# Patient Record
Sex: FEMALE | Race: WHITE | Employment: FULL TIME | URBAN - METROPOLITAN AREA
[De-identification: names, ages, dates, MRNs, and addresses within clinical notes are randomized per-mention and may not be internally consistent; named-entity substitution may affect disease eponyms.]

---

## 2017-08-10 ENCOUNTER — OFFICE VISIT (OUTPATIENT)
Dept: INTERNAL MEDICINE CLINIC | Age: 62
End: 2017-08-10

## 2017-08-10 VITALS
DIASTOLIC BLOOD PRESSURE: 78 MMHG | TEMPERATURE: 97.5 F | RESPIRATION RATE: 18 BRPM | HEIGHT: 70 IN | OXYGEN SATURATION: 100 % | SYSTOLIC BLOOD PRESSURE: 120 MMHG | HEART RATE: 65 BPM | WEIGHT: 202.4 LBS | BODY MASS INDEX: 28.98 KG/M2

## 2017-08-10 DIAGNOSIS — E78.5 HYPERLIPIDEMIA, UNSPECIFIED HYPERLIPIDEMIA TYPE: ICD-10-CM

## 2017-08-10 DIAGNOSIS — Z12.11 SCREEN FOR COLON CANCER: ICD-10-CM

## 2017-08-10 DIAGNOSIS — Z00.00 ROUTINE GENERAL MEDICAL EXAMINATION AT A HEALTH CARE FACILITY: Primary | ICD-10-CM

## 2017-08-10 DIAGNOSIS — N39.0 URINARY TRACT INFECTION WITHOUT HEMATURIA, SITE UNSPECIFIED: ICD-10-CM

## 2017-08-10 DIAGNOSIS — I10 ESSENTIAL HYPERTENSION: ICD-10-CM

## 2017-08-10 LAB
BILIRUB UR QL STRIP: NEGATIVE
GLUCOSE UR-MCNC: NEGATIVE MG/DL
KETONES P FAST UR STRIP-MCNC: NEGATIVE MG/DL
PH UR STRIP: 6 [PH] (ref 4.6–8)
PROT UR QL STRIP: NEGATIVE MG/DL
SP GR UR STRIP: 1.01 (ref 1–1.03)
UA UROBILINOGEN AMB POC: NORMAL (ref 0.2–1)
URINALYSIS CLARITY POC: CLEAR
URINALYSIS COLOR POC: YELLOW
URINE BLOOD POC: NEGATIVE
URINE LEUKOCYTES POC: NORMAL
URINE NITRITES POC: NEGATIVE

## 2017-08-10 RX ORDER — CIPROFLOXACIN 500 MG/1
500 TABLET ORAL 2 TIMES DAILY
Qty: 6 TAB | Refills: 0 | Status: SHIPPED | OUTPATIENT
Start: 2017-08-10 | End: 2017-08-13

## 2017-08-10 NOTE — PATIENT INSTRUCTIONS
Well Visit, Women 48 to 72: Care Instructions  Your Care Instructions  Physical exams can help you stay healthy. Your doctor has checked your overall health and may have suggested ways to take good care of yourself. He or she also may have recommended tests. At home, you can help prevent illness with healthy eating, regular exercise, and other steps. Follow-up care is a key part of your treatment and safety. Be sure to make and go to all appointments, and call your doctor if you are having problems. It's also a good idea to know your test results and keep a list of the medicines you take. How can you care for yourself at home? · Reach and stay at a healthy weight. This will lower your risk for many problems, such as obesity, diabetes, heart disease, and high blood pressure. · Get at least 30 minutes of exercise on most days of the week. Walking is a good choice. You also may want to do other activities, such as running, swimming, cycling, or playing tennis or team sports. · Do not smoke. Smoking can make health problems worse. If you need help quitting, talk to your doctor about stop-smoking programs and medicines. These can increase your chances of quitting for good. · Protect your skin from too much sun. When you're outdoors from 10 a.m. to 4 p.m., stay in the shade or cover up with clothing and a hat with a wide brim. Wear sunglasses that block UV rays. Even when it's cloudy, put broad-spectrum sunscreen (SPF 30 or higher) on any exposed skin. · See a dentist one or two times a year for checkups and to have your teeth cleaned. · Wear a seat belt in the car. · Limit alcohol to 1 drink a day. Too much alcohol can cause health problems. Follow your doctor's advice about when to have certain tests. These tests can spot problems early. · Cholesterol.  Your doctor will tell you how often to have this done based on your age, family history, or other things that can increase your risk for heart attack and stroke. · Blood pressure. Have your blood pressure checked during a routine doctor visit. Your doctor will tell you how often to check your blood pressure based on your age, your blood pressure results, and other factors. · Mammogram. Ask your doctor how often you should have a mammogram, which is an X-ray of your breasts. A mammogram can spot breast cancer before it can be felt and when it is easiest to treat. · Pap test and pelvic exam. Ask your doctor how often you should have a Pap test. You may not need to have a Pap test as often as you used to. · Vision. Have your eyes checked every year or two or as often as your doctor suggests. Some experts recommend that you have yearly exams for glaucoma and other age-related eye problems starting at age 48. · Hearing. Tell your doctor if you notice any change in your hearing. You can have tests to find out how well you hear. · Diabetes. Ask your doctor whether you should have tests for diabetes. · Colon cancer. You should begin tests for colon cancer at age 48. You may have one of several tests. Your doctor will tell you how often to have tests based on your age and risk. Risks include whether you already had a precancerous polyp removed from your colon or whether your parents, sisters and brothers, or children have had colon cancer. · Thyroid disease. Talk to your doctor about whether to have your thyroid checked as part of a regular physical exam. Women have an increased chance of a thyroid problem. · Osteoporosis. You should begin tests for bone density at age 72. If you are younger than 72, ask your doctor whether you have factors that may increase your risk for this disease. You may want to have this test before age 72. · Heart attack and stroke risk. At least every 4 to 6 years, you should have your risk for heart attack and stroke assessed.  Your doctor uses factors such as your age, blood pressure, cholesterol, and whether you smoke or have diabetes to show what your risk for a heart attack or stroke is over the next 10 years. When should you call for help? Watch closely for changes in your health, and be sure to contact your doctor if you have any problems or symptoms that concern you. Where can you learn more? Go to http://afsaneh-jeana.info/. Enter O352 in the search box to learn more about \"Well Visit, Women 50 to 72: Care Instructions. \"  Current as of: July 19, 2016  Content Version: 11.3  © 3399-8571 [employer] [place]. Care instructions adapted under license by [employer] (which disclaims liability or warranty for this information). If you have questions about a medical condition or this instruction, always ask your healthcare professional. [address] any warranty or liability for your use of this information. Learning About Cutting Calories  How do calories affect your weight? Food gives your body energy. Energy from the food you eat is measured in calories. This energy keeps your heart beating, your brain active, and your muscles working. Your body needs a certain number of calories each day. After your body uses the calories it needs, it stores extra calories as fat. To lose weight safely, you have to eat fewer calories while eating in a healthy way. How many calories do you need each day? The more active you are, the more calories you need. When you are less active, you need fewer calories. How many calories you need each day also depends on several things, including your age and whether you are male or female. Here are some general guidelines for adults:  · Less active women and older adults need 1,600 to 2,000 calories each day. · Active women and less active men need 2,000 to 2,400 calories each day. · Active men need 2,400 to 3,000 calories each day. How can you cut calories and eat healthy meals?   Whole grains, vegetables and fruits, and dried beans are good lower-calorie foods. They give you lots of nutrients and fiber. And they fill you up. Sweets, energy drinks, and soda pop are high in calories. They give you few nutrients and no fiber. Try to limit soda pop, fruit juice, and energy drinks. Drink water instead. Some fats can be part of a healthy diet. But cutting back on fats from highly processed foods like fast foods and many snack foods is a good way to lower the calories in your diet. Also, use smaller amounts of fats like butter, margarine, salad dressing, and mayonnaise. Add fresh garlic, lemon, or herbs to your meals to add flavor without adding fat. Meats and dairy products can be a big source of hidden fats. Try to choose lean or low-fat versions of these products. Fat-free cookies, candies, chips, and frozen treats can still be high in sugar and calories. Some fat-free foods have more calories than regular ones. Eat fat-free treats in moderation, as you would other foods. If your favorite foods are high in fat, salt, sugar, or calories, limit how often you eat them. Eat smaller servings, or look for healthy substitutes. Fill up on fruits, vegetables, and whole grains. Eating at home  · Use meat as a side dish instead of as the main part of your meal.  · Try main dishes that use whole wheat pasta, brown rice, dried beans, or vegetables. · Find ways to cook with little or no fat, such as broiling, steaming, or grilling. · Use cooking spray instead of oil. If you use oil, use a monounsaturated oil, such as canola or olive oil. · Trim fat from meats before you cook them. · Drain off fat after you brown the meat or while you roast it. · Chill soups and stews after you cook them. Then skim the fat off the top after it hardens. Eating out  · Order foods that are broiled or poached rather than fried or breaded. · Cut back on the amount of butter or margarine that you use on bread.   · Order sauces, gravies, and salad dressings on the side, and use only a little. · When you order pasta, choose tomato sauce rather than cream sauce. · Ask for salsa with your baked potato instead of sour cream, butter, cheese, or owusu. · Order meals in a small size instead of upgrading to a large. · Share an entree, or take part of your food home to eat as another meal.  · Share appetizers and desserts. Where can you learn more? Go to http://afsaneh-jeana.info/. Enter 99 [id number] in the search box to learn more about \"Learning About Cutting Calories. \"  Current as of: November 9, 2016  Content Version: 11.3  © 2758-0589 [employer]. Care instructions adapted under license by [employer] (which disclaims liability or warranty for this information). If you have questions about a medical condition or this instruction, always ask your healthcare professional. [place] [address] any warranty or liability for your use of this information.

## 2017-08-10 NOTE — MR AVS SNAPSHOT
Visit Information Date & Time Provider Department Dept. Phone Encounter #  
 8/10/2017  9:15 AM Chad Childress, [address] [place] [address],[address] [phone] [id number] Follow-up Instructions Return in about 1 year (around 8/10/2018) for CPE. Follow-up and Disposition History Upcoming Health Maintenance Date Due Pneumococcal 19-64 Highest Risk (1 of 3 - PCV13) 5/27/1976 DTaP/Tdap/Td series (1 - Tdap) 5/27/1978 ZOSTER VACCINE AGE 60> 3/27/2017 COLONOSCOPY 6/27/2017 INFLUENZA AGE 9 TO ADULT 8/1/2017 BREAST CANCER SCRN MAMMOGRAM 8/30/2018 PAP AKA CERVICAL CYTOLOGY 8/30/2019 Allergies as of 8/10/2017  Review Complete On: 8/10/2017 By: Chad Childress MD  
  
 Severity Noted Reaction Type Reactions Guiafen-pse [Pseudoephedrine-guaifenesin]  01/20/2011   Side Effect Other (comments) Current Immunizations  Reviewed on 11/2/2011 Name Date Influenza Vaccine Split 11/1/2012  5:05 PM, 11/2/2011 Not reviewed this visit You Were Diagnosed With   
  
 Codes Comments Routine general medical examination at a health care facility    -  Primary ICD-10-CM: Z00.00 ICD-9-CM: V70.0 Urinary tract infection without hematuria, site unspecified     ICD-10-CM: N39.0 ICD-9-CM: 599.0 Essential hypertension     ICD-10-CM: I10 
ICD-9-CM: 401.9 Hyperlipidemia, unspecified hyperlipidemia type     ICD-10-CM: E78.5 ICD-9-CM: 272.4 Screen for colon cancer     ICD-10-CM: Z12.11 ICD-9-CM: V76.51 Vitals BP Pulse Temp Resp Height(growth percentile) Weight(growth percentile) 120/78 (BP 1 Location: Left arm, BP Patient Position: Sitting) 65 97.5 °F (36.4 °C) (Oral) 18 5' 10\" (1.778 m) 202 lb 6.4 oz (91.8 kg) SpO2 BMI OB Status Smoking Status 100% 29.04 kg/m2 Hysterectomy Never Smoker Vitals History BMI and BSA Data Body Mass Index Body Surface Area 29.04 kg/m 2 2.13 m 2 Preferred Pharmacy Pharmacy Name Phone Aimee Snow E [facility] [phone] Your Updated Medication List  
  
   
This list is accurate as of: 8/10/17  9:41 AM.  Always use your most recent med list.  
  
  
  
  
 ALPRAZolam 0.25 mg tablet Commonly known as:  Paulinaearlene Learyowsky Take 1 Tab by mouth three (3) times daily as needed for Anxiety. CINNAMON 500 mg Cap Generic drug:  cinnamon bark Take 1,000 mg by mouth daily. ciprofloxacin HCl 500 mg tablet Commonly known as:  CIPRO Take 1 Tab by mouth two (2) times a day for 3 days. clindamycin 1 % topical gel Commonly known as:  CLINDAGEL  
APPLY TO AFFECTED AREA AS NEEDED FISH OIL 1,000 mg Cap Generic drug:  omega-3 fatty acids-vitamin e Take 1 Cap by mouth daily. hydroCHLOROthiazide 25 mg tablet Commonly known as:  HYDRODIURIL  
take 1 tablet by mouth once daily  
  
 losartan 50 mg tablet Commonly known as:  COZAAR Take 1 Tab by mouth daily. MUCINEX 600 mg SR tablet Generic drug:  guaiFENesin SR Take 600 mg by mouth two (2) times a day. MULTI-VITAMIN PO Take  by mouth.  
  
 piroxicam 20 mg capsule Commonly known as:  Plascencia Virgil Take 1 Cap by mouth daily. VITAMIN D3 1,000 unit tablet Generic drug:  cholecalciferol Take 2,000 Units by mouth daily. Prescriptions Sent to Pharmacy Refills  
 ciprofloxacin HCl (CIPRO) 500 mg tablet 0 Sig: Take 1 Tab by mouth two (2) times a day for 3 days. Class: Normal  
 Pharmacy: [address] [facility] Ph #: [phone] Route: Oral  
  
We Performed the Following AMB POC URINALYSIS DIP STICK AUTO W/O MICRO [49020 CPT(R)] CBC WITH AUTOMATED DIFF [07595 CPT(R)] CULTURE, URINE [id number] CPT(R)] LIPID PANEL [41066 CPT(R)] METABOLIC PANEL, COMPREHENSIVE [57962 CPT(R)] REFERRAL TO GASTROENTEROLOGY [AAI96 Custom] URINALYSIS W/ RFLX MICROSCOPIC [54094 CPT(R)] VITAMIN D, 25 HYDROXY [id number] CPT(R)] Follow-up Instructions Return in about 1 year (around 8/10/2018) for CPE. To-Do List   
 08/30/2017 1:30 PM  
  Appointment with [facility] 1 at [address] [place] [address] ([phone]) Shower or bathe using soap and water. Do not use deodorant, powder, perfumes, or lotion the day of your exam.  If your prior mammograms were not performed at [facility] 6 please bring films with you or forward prior images 2 days before your procedure. Check in at registration 15min before your appointment time unless you were instructed to do otherwise. A script is not necessary, but if you have one, please bring it on the day of the mammogram or have it faxed to the department. [facility] [phone] [facility]  [phone] [address]  [phone] [facility] [phone] [facility] [facility] [phone] Referral Information Referral ID Referred By Referred To  
  
 [id number] Dora POLANCO [address], [place]   
   [address] [place] [address] Phone: [phone] Fax: [phone] Visits Status Start Date End Date 1 New Request 8/10/17 8/10/18 If your referral has a status of pending review or denied, additional information will be sent to support the outcome of this decision. Patient Instructions Well Visit, Women 48 to 72: Care Instructions Your Care Instructions Physical exams can help you stay healthy. Your doctor has checked your overall health and may have suggested ways to take good care of yourself. He or she also may have recommended tests. At home, you can help prevent illness with healthy eating, regular exercise, and other steps. Follow-up care is a key part of your treatment and safety.  Be sure to make and go to all appointments, and call your doctor if you are having problems. It's also a good idea to know your test results and keep a list of the medicines you take. How can you care for yourself at home? · Reach and stay at a healthy weight. This will lower your risk for many problems, such as obesity, diabetes, heart disease, and high blood pressure. · Get at least 30 minutes of exercise on most days of the week. Walking is a good choice. You also may want to do other activities, such as running, swimming, cycling, or playing tennis or team sports. · Do not smoke. Smoking can make health problems worse. If you need help quitting, talk to your doctor about stop-smoking programs and medicines. These can increase your chances of quitting for good. · Protect your skin from too much sun. When you're outdoors from 10 a.m. to 4 p.m., stay in the shade or cover up with clothing and a hat with a wide brim. Wear sunglasses that block UV rays. Even when it's cloudy, put broad-spectrum sunscreen (SPF 30 or higher) on any exposed skin. · See a dentist one or two times a year for checkups and to have your teeth cleaned. · Wear a seat belt in the car. · Limit alcohol to 1 drink a day. Too much alcohol can cause health problems. Follow your doctor's advice about when to have certain tests. These tests can spot problems early. · Cholesterol. Your doctor will tell you how often to have this done based on your age, family history, or other things that can increase your risk for heart attack and stroke. · Blood pressure. Have your blood pressure checked during a routine doctor visit. Your doctor will tell you how often to check your blood pressure based on your age, your blood pressure results, and other factors. · Mammogram. Ask your doctor how often you should have a mammogram, which is an X-ray of your breasts. A mammogram can spot breast cancer before it can be felt and when it is easiest to treat.  
· Pap test and pelvic exam. Ask your doctor how often you should have a Pap test. You may not need to have a Pap test as often as you used to. · Vision. Have your eyes checked every year or two or as often as your doctor suggests. Some experts recommend that you have yearly exams for glaucoma and other age-related eye problems starting at age 48. · Hearing. Tell your doctor if you notice any change in your hearing. You can have tests to find out how well you hear. · Diabetes. Ask your doctor whether you should have tests for diabetes. · Colon cancer. You should begin tests for colon cancer at age 48. You may have one of several tests. Your doctor will tell you how often to have tests based on your age and risk. Risks include whether you already had a precancerous polyp removed from your colon or whether your parents, sisters and brothers, or children have had colon cancer. · Thyroid disease. Talk to your doctor about whether to have your thyroid checked as part of a regular physical exam. Women have an increased chance of a thyroid problem. · Osteoporosis. You should begin tests for bone density at age 72. If you are younger than 72, ask your doctor whether you have factors that may increase your risk for this disease. You may want to have this test before age 72. · Heart attack and stroke risk. At least every 4 to 6 years, you should have your risk for heart attack and stroke assessed. Your doctor uses factors such as your age, blood pressure, cholesterol, and whether you smoke or have diabetes to show what your risk for a heart attack or stroke is over the next 10 years. When should you call for help? Watch closely for changes in your health, and be sure to contact your doctor if you have any problems or symptoms that concern you. Where can you learn more? Go to http://afsaneh-jeana.info/. Enter W173 in the search box to learn more about \"Well Visit, Women 50 to 72: Care Instructions. \" Current as of: July 19, 2016 Content Version: 11.3 © 5675-2224 [employer]. Care instructions adapted under license by [employer] (which disclaims liability or warranty for this information). If you have questions about a medical condition or this instruction, always ask your healthcare professional. [address] any warranty or liability for your use of this information. Learning About Cutting Calories How do calories affect your weight? Food gives your body energy. Energy from the food you eat is measured in calories. This energy keeps your heart beating, your brain active, and your muscles working. Your body needs a certain number of calories each day. After your body uses the calories it needs, it stores extra calories as fat. To lose weight safely, you have to eat fewer calories while eating in a healthy way. How many calories do you need each day? The more active you are, the more calories you need. When you are less active, you need fewer calories. How many calories you need each day also depends on several things, including your age and whether you are male or female. Here are some general guidelines for adults: 
· Less active women and older adults need 1,600 to 2,000 calories each day. · Active women and less active men need 2,000 to 2,400 calories each day. · Active men need 2,400 to 3,000 calories each day. How can you cut calories and eat healthy meals? Whole grains, vegetables and fruits, and dried beans are good lower-calorie foods. They give you lots of nutrients and fiber. And they fill you up. Sweets, energy drinks, and soda pop are high in calories. They give you few nutrients and no fiber. Try to limit soda pop, fruit juice, and energy drinks. Drink water instead. Some fats can be part of a healthy diet. But cutting back on fats from highly processed foods like fast foods and many snack foods is a good way to lower the calories in your diet.  Also, use smaller amounts of fats like butter, margarine, salad dressing, and mayonnaise. Add fresh garlic, lemon, or herbs to your meals to add flavor without adding fat. Meats and dairy products can be a big source of hidden fats. Try to choose lean or low-fat versions of these products. Fat-free cookies, candies, chips, and frozen treats can still be high in sugar and calories. Some fat-free foods have more calories than regular ones. Eat fat-free treats in moderation, as you would other foods. If your favorite foods are high in fat, salt, sugar, or calories, limit how often you eat them. Eat smaller servings, or look for healthy substitutes. Fill up on fruits, vegetables, and whole grains. Eating at home · Use meat as a side dish instead of as the main part of your meal. 
· Try main dishes that use whole wheat pasta, brown rice, dried beans, or vegetables. · Find ways to cook with little or no fat, such as broiling, steaming, or grilling. · Use cooking spray instead of oil. If you use oil, use a monounsaturated oil, such as canola or olive oil. · Trim fat from meats before you cook them. · Drain off fat after you brown the meat or while you roast it. · Chill soups and stews after you cook them. Then skim the fat off the top after it hardens. Eating out · Order foods that are broiled or poached rather than fried or breaded. · Cut back on the amount of butter or margarine that you use on bread. · Order sauces, gravies, and salad dressings on the side, and use only a little. · When you order pasta, choose tomato sauce rather than cream sauce. · Ask for salsa with your baked potato instead of sour cream, butter, cheese, or owusu. · Order meals in a small size instead of upgrading to a large. · Share an entree, or take part of your food home to eat as another meal. 
· Share appetizers and desserts. Where can you learn more? Go to http://vernell.info/. Enter 99 220383 in the search box to learn more about \"Learning About Cutting Calories. \" Current as of: November 9, 2016 Content Version: 11.3 © 2005-9240 [employer]. Care instructions adapted under license by [employer] (which disclaims liability or warranty for this information). If you have questions about a medical condition or this instruction, always ask your healthcare professional. [address] any warranty or liability for your use of this information. Please provide this summary of care documentation to your next provider. Your primary care clinician is listed as [place]. If you have any questions after today's visit, please call [phone].

## 2017-08-10 NOTE — PROGRESS NOTES
1. Have you been to the ER, urgent care clinic since your last visit? Hospitalized since your last visit?no    2. Have you seen or consulted any other health care providers outside of the [address] [place] [address] since your last visit? Include any pap smears or colon screening.  no

## 2017-08-10 NOTE — PROGRESS NOTES
SUBJECTIVE:   Ms. Mammie Closs is a 63 y.o. female who is here for follow up of routine medical issues. CPE. Formerly she saw Dr. Savanna Ramachandran. Chief Complaint   Patient presents with    Hypertension     pt here today for routine f.u    Bladder Infection     pt concerned wants to get a urine test today for bladder infection       She was losing weight, then \"my mom got into an accident and I gained back some. \"  Got down to 198. Participating in [place]. Wt Readings from Last 3 Encounters:   08/10/17 202 lb 6.4 oz (91.8 kg)   08/30/16 207 lb 9.6 oz (94.2 kg)   08/15/16 207 lb 6.4 oz (94.1 kg)      Went to Urgent Care with UTI, put on bactrim; a few weeks ago. Still has some pressure after urinating. She has \"chronic urethritis\"--she has seen Dr. Trina Wilson in the past.  Treated for 3-4 months with doxy. \"I still have symptoms, and it affects my bowels and everything. 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 \"  Got a sinus infection, and \"It started up again yesterday and I couldn't talk. \"  Took decongestant and mucinex; \"that seemed to help\". Sees allergist, Dr. Jeramie Gómez. She has had allergy shots in the past.  Feels she has reactions to mold at work. At this time, she is otherwise doing well and has brought no other complaints to my attention today. For a list of the medical issues addressed today, see the assessment and plan below.     PMH:  Dr. Ck Aragon is gynecologist.   Past Medical History:   Diagnosis Date    Broken leg 3/28/15    Cancer [facility])     endometrial cancer    Cystitis, acute 1/20/2011    Cystitis, acute 1/20/2011    Frequent UTI     GERD (gastroesophageal reflux disease)     Hypertension     Screening mammogram 04/2010    Vitamin D insufficiency 7/30/2014       Past Surgical History:   Procedure Laterality Date    HX COLONOSCOPY      HX GYN      cervical bx    HX ORTHOPAEDIC  7/3/11    fracture knee    HX ORTHOPAEDIC  2015    BROKEN LEFT LEG    HX MARIZOL AND BSO      Dr. Nilson Ferris; endometrial cancer       All: She is allergic to guiafen-pse [pseudoephedrine-guaifenesin]. Current Outpatient Prescriptions   Medication Sig    ALPRAZolam (XANAX) 0.25 mg tablet Take 1 Tab by mouth three (3) times daily as needed for Anxiety.  losartan (COZAAR) 50 mg tablet Take 1 Tab by mouth daily.  hydrochlorothiazide (HYDRODIURIL) 25 mg tablet take 1 tablet by mouth once daily    clindamycin (CLINDAGEL) 1 % topical gel APPLY TO AFFECTED AREA AS NEEDED    omega-3 fatty acids-vitamin e (FISH OIL) 1,000 mg Cap Take 1 Cap by mouth daily.  cinnamon bark (CINNAMON) 500 mg Cap Take 1,000 mg by mouth daily.  cholecalciferol, vitamin d3, (VITAMIN D) 1,000 unit tablet Take 2,000 Units by mouth daily.  guaifenesin SR (MUCINEX) 600 mg SR tablet Take 600 mg by mouth two (2) times a day.  MULTI-VITAMIN PO Take  by mouth.  azithromycin (ZITHROMAX) 250 mg tablet Take two tablets today then one tablet daily    fluconazole (DIFLUCAN) 200 mg tablet Take 1 Tab by mouth daily.  piroxicam (FELDENE) 20 mg capsule Take 1 Cap by mouth daily. No current facility-administered medications for this visit. FH: Her family history includes Arthritis-rheumatoid in her father; Heart Disease in her father; Hypertension in her father, mother, and sister. F  82 \"heart valve problem. 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 She reports that she has never smoked. She has never used smokeless tobacco. She reports that she does not drink alcohol or use illicit drugs. ROS: See above; Complete ROS otherwise negative. OBJECTIVE:   Vitals:   Visit Vitals    /78 (BP 1 Location: Left arm, BP Patient Position: Sitting)    Pulse 65    Temp 97.5 °F (36.4 °C) (Oral)    Resp 18    Ht 5' 10\" (1.778 m)    Wt 202 lb 6.4 oz (91.8 kg)    SpO2 100%    BMI 29.04 kg/m2      Gen: Pleasant 63 y.o.  female in NAD. HEENT: PERRLA. EOMI. OP moist and pink. Neck: Supple. No LAD.  HEART: RRR, No M/G/R.    LUNGS: CTAB No W/R. ABDOMEN: S, NT, ND, BS+. EXTREMITIES: Warm. No C/C/E.  MUSCULOSKELETAL: Normal ROM, muscle strength 5/5 all groups. NEURO: Alert and oriented x 3. Cranial nerves grossly intact. No focal sensory or motor deficits noted. SKIN: Warm. Dry. No rashes or other lesions noted. Lab Results   Component Value Date/Time    Sodium 139 08/15/2016 11:08 AM    Potassium 4.5 08/15/2016 11:08 AM    Chloride 98 08/15/2016 11:08 AM    CO2 25 08/15/2016 11:08 AM    Anion gap 10 08/25/2011 06:05 AM    Glucose 96 08/15/2016 11:08 AM    BUN 15 08/15/2016 11:08 AM    Creatinine 0.75 08/15/2016 11:08 AM    BUN/Creatinine ratio 20 08/15/2016 11:08 AM    GFR est  08/15/2016 11:08 AM    GFR est non-AA 88 08/15/2016 11:08 AM    Calcium 9.7 08/15/2016 11:08 AM    Bilirubin, total 0.5 08/15/2016 11:08 AM    ALT (SGPT) 18 08/15/2016 11:08 AM    AST (SGOT) 22 08/15/2016 11:08 AM    Alk. phosphatase 86 08/15/2016 11:08 AM    Protein, total 7.3 08/15/2016 11:08 AM    Albumin 4.7 08/15/2016 11:08 AM    Globulin 3.2 08/19/2011 02:30 PM    A-G Ratio 1.8 08/15/2016 11:08 AM       Lab Results   Component Value Date/Time    Cholesterol, total 237 08/15/2016 11:08 AM    HDL Cholesterol 38 08/15/2016 11:08 AM    LDL, calculated 151 08/15/2016 11:08 AM    Triglyceride 240 08/15/2016 11:08 AM       Lab Results   Component Value Date/Time    WBC 8.2 08/15/2016 11:08 AM    HGB 13.5 08/15/2016 11:08 AM    HCT 39.0 08/15/2016 11:08 AM    PLATELET 988 18/87/2074 11:08 AM    MCV 91 08/15/2016 11:08 AM           ASSESSMENT/ PLAN: Jaimie Lang was seen today for follow-up. 1. CPE: Normal exam.   2. UTI: On recheck, trace kathy. We'll get micro and culture. 3. HTN (hypertension): BP fine today. Continue current medications.   - METABOLIC PANEL, COMPREHENSIVE  - CBC WITH AUTOMATED DIFF  4. Hyperlipidemia: Improved since last time. No changes.   - METABOLIC PANEL, COMPREHENSIVE  - LIPID PANEL  5.  Urethritis, nonspecific: Continue follow up with Dr. Nahed Catalan.   - Tima Mahoney, COMPREHENSIVE  6. Back pain: UA ordered and shows result above--could be originating from bladder. Could be radicular pain as she has a history of sciatica. 7. Vitamin D insufficiency:   - VITAMIN D, 25 HYDROXY  8. Hx of colonic polyp  9. Chronic rhinitis:    10. Reactive airways: Sees allergist, Dr. Carmelo Rosenberg. 11. History of endometrial cancer: s/p hysterctomy. Follows with gynecology, Dr. Jonas Morel. 12. Acne: On cleocin cream.     Follow-up Disposition:  Return in about 1 year (around 8/10/2018) for CPE. I have reviewed the patient's medications and risks/side effects/benefits were discussed. Diagnosis(-es) explained to patient and questions answered. Literature provided where appropriate.

## 2017-08-11 LAB
25(OH)D3+25(OH)D2 SERPL-MCNC: 41.6 NG/ML (ref 30–100)
ALBUMIN SERPL-MCNC: 4.5 G/DL (ref 3.6–4.8)
ALBUMIN/GLOB SERPL: 1.8 {RATIO} (ref 1.2–2.2)
ALP SERPL-CCNC: 83 IU/L (ref 39–117)
ALT SERPL-CCNC: 19 IU/L (ref 0–32)
APPEARANCE UR: CLEAR
AST SERPL-CCNC: 22 IU/L (ref 0–40)
BACTERIA #/AREA URNS HPF: NORMAL /[HPF]
BACTERIA UR CULT: NO GROWTH
BASOPHILS # BLD AUTO: 0 X10E3/UL (ref 0–0.2)
BASOPHILS NFR BLD AUTO: 1 %
BILIRUB SERPL-MCNC: 0.4 MG/DL (ref 0–1.2)
BILIRUB UR QL STRIP: NEGATIVE
BUN SERPL-MCNC: 19 MG/DL (ref 8–27)
BUN/CREAT SERPL: 22 (ref 12–28)
CALCIUM SERPL-MCNC: 9.5 MG/DL (ref 8.7–10.3)
CASTS URNS QL MICRO: NORMAL /LPF
CHLORIDE SERPL-SCNC: 99 MMOL/L (ref 96–106)
CHOLEST SERPL-MCNC: 214 MG/DL (ref 100–199)
CO2 SERPL-SCNC: 23 MMOL/L (ref 18–29)
COLOR UR: YELLOW
CREAT SERPL-MCNC: 0.85 MG/DL (ref 0.57–1)
EOSINOPHIL # BLD AUTO: 0.4 X10E3/UL (ref 0–0.4)
EOSINOPHIL NFR BLD AUTO: 6 %
EPI CELLS #/AREA URNS HPF: NORMAL /HPF
ERYTHROCYTE [DISTWIDTH] IN BLOOD BY AUTOMATED COUNT: 14.2 % (ref 12.3–15.4)
GLOBULIN SER CALC-MCNC: 2.5 G/DL (ref 1.5–4.5)
GLUCOSE SERPL-MCNC: 92 MG/DL (ref 65–99)
GLUCOSE UR QL: NEGATIVE
HCT VFR BLD AUTO: 38.6 % (ref 34–46.6)
HDLC SERPL-MCNC: 39 MG/DL
HGB BLD-MCNC: 13.1 G/DL (ref 11.1–15.9)
HGB UR QL STRIP: NEGATIVE
IMM GRANULOCYTES # BLD: 0 X10E3/UL (ref 0–0.1)
IMM GRANULOCYTES NFR BLD: 0 %
KETONES UR QL STRIP: NEGATIVE
LDLC SERPL CALC-MCNC: 135 MG/DL (ref 0–99)
LEUKOCYTE ESTERASE UR QL STRIP: ABNORMAL
LYMPHOCYTES # BLD AUTO: 1.9 X10E3/UL (ref 0.7–3.1)
LYMPHOCYTES NFR BLD AUTO: 28 %
MCH RBC QN AUTO: 31.6 PG (ref 26.6–33)
MCHC RBC AUTO-ENTMCNC: 33.9 G/DL (ref 31.5–35.7)
MCV RBC AUTO: 93 FL (ref 79–97)
MICRO URNS: ABNORMAL
MONOCYTES # BLD AUTO: 0.4 X10E3/UL (ref 0.1–0.9)
MONOCYTES NFR BLD AUTO: 6 %
MUCOUS THREADS URNS QL MICRO: PRESENT
NEUTROPHILS # BLD AUTO: 4 X10E3/UL (ref 1.4–7)
NEUTROPHILS NFR BLD AUTO: 59 %
NITRITE UR QL STRIP: NEGATIVE
PH UR STRIP: 6 [PH] (ref 5–7.5)
PLATELET # BLD AUTO: 320 X10E3/UL (ref 150–379)
POTASSIUM SERPL-SCNC: 4.3 MMOL/L (ref 3.5–5.2)
PROT SERPL-MCNC: 7 G/DL (ref 6–8.5)
PROT UR QL STRIP: NEGATIVE
RBC # BLD AUTO: 4.14 X10E6/UL (ref 3.77–5.28)
RBC #/AREA URNS HPF: NORMAL /HPF
SODIUM SERPL-SCNC: 141 MMOL/L (ref 134–144)
SP GR UR: 1.01 (ref 1–1.03)
TRIGL SERPL-MCNC: 199 MG/DL (ref 0–149)
UROBILINOGEN UR STRIP-MCNC: 0.2 MG/DL (ref 0.2–1)
VLDLC SERPL CALC-MCNC: 40 MG/DL (ref 5–40)
WBC # BLD AUTO: 6.7 X10E3/UL (ref 3.4–10.8)
WBC #/AREA URNS HPF: NORMAL /HPF

## 2017-08-16 ENCOUNTER — TELEPHONE (OUTPATIENT)
Dept: INTERNAL MEDICINE CLINIC | Age: 62
End: 2017-08-16

## 2017-08-21 ENCOUNTER — TELEPHONE (OUTPATIENT)
Dept: INTERNAL MEDICINE CLINIC | Age: 62
End: 2017-08-21

## 2017-08-21 NOTE — TELEPHONE ENCOUNTER
#[phone] please call pt with lab and urine culture results from 8-10-17     Also will you please mail lab results to pt

## 2017-08-21 NOTE — TELEPHONE ENCOUNTER
Message was left for  Patient to contact office about labs and that a copy will be mailed to her. Patient returned call Identified patient 2 identifiers verified. And was informed if lab result.

## 2017-08-22 RX ORDER — LOSARTAN POTASSIUM 50 MG/1
TABLET ORAL
Qty: 90 TAB | Refills: 3 | Status: SHIPPED | OUTPATIENT
Start: 2017-08-22 | End: 2017-11-10 | Stop reason: SDUPTHER

## 2017-08-30 ENCOUNTER — HOSPITAL ENCOUNTER (OUTPATIENT)
Dept: MAMMOGRAPHY | Age: 62
Discharge: HOME | End: 2017-08-30
Attending: OBSTETRICS & GYNECOLOGY
Payer: COMMERCIAL

## 2017-08-30 DIAGNOSIS — Z12.31 VISIT FOR SCREENING MAMMOGRAM: ICD-10-CM

## 2017-08-30 PROCEDURE — 77067 SCR MAMMO BI INCL CAD: CPT

## 2017-09-06 ENCOUNTER — TELEPHONE (OUTPATIENT)
Dept: INTERNAL MEDICINE CLINIC | Age: 62
End: 2017-09-06

## 2017-09-06 NOTE — TELEPHONE ENCOUNTER
Having bladder issues and started on Cipro last night and wants to talk to nurse about pain and discomfort.  Please call [phone]

## 2017-09-06 NOTE — TELEPHONE ENCOUNTER
Identified patient 2 identifiers verified. C/o pain and cramp on left side earlier has stopped some at this time. Taking Azo OTC . Wants to know is there anything else that can be ordered?

## 2017-09-07 ENCOUNTER — TELEPHONE (OUTPATIENT)
Dept: INTERNAL MEDICINE CLINIC | Age: 62
End: 2017-09-07

## 2017-09-07 NOTE — TELEPHONE ENCOUNTER
We can call in some tramadol: 50 mg po q6 hr prn pain (15, 1 refill). She should try to see Urology soon.    BJF

## 2017-09-07 NOTE — TELEPHONE ENCOUNTER
#[phone] pt states she is still having symptoms of the UTI and is requesting to get another script of the Cipro 500 mg to [facility] on file. Pt took appt for tomorrow at 4 pm and doesn't know if she will feel well enough to drive this far. She is asking for a call back as to what Dr. Elicia Vora wants to do.

## 2017-09-07 NOTE — TELEPHONE ENCOUNTER
Tramadol was called in to [facility] per patient request. Message was left for patient about new script and to follow up with Urology.

## 2017-09-08 ENCOUNTER — OFFICE VISIT (OUTPATIENT)
Dept: INTERNAL MEDICINE CLINIC | Age: 62
End: 2017-09-08

## 2017-09-08 VITALS
RESPIRATION RATE: 14 BRPM | WEIGHT: 199.6 LBS | TEMPERATURE: 97.9 F | HEIGHT: 70 IN | DIASTOLIC BLOOD PRESSURE: 73 MMHG | OXYGEN SATURATION: 98 % | SYSTOLIC BLOOD PRESSURE: 107 MMHG | HEART RATE: 82 BPM | BODY MASS INDEX: 28.58 KG/M2

## 2017-09-08 DIAGNOSIS — R10.32 LLQ ABDOMINAL PAIN: Primary | ICD-10-CM

## 2017-09-08 RX ORDER — METRONIDAZOLE 500 MG/1
500 TABLET ORAL 3 TIMES DAILY
Qty: 21 TAB | Refills: 0 | Status: SHIPPED | OUTPATIENT
Start: 2017-09-08 | End: 2017-09-15

## 2017-09-08 RX ORDER — CIPROFLOXACIN 250 MG/1
250 TABLET, FILM COATED ORAL 2 TIMES DAILY
Qty: 14 TAB | Refills: 0 | Status: SHIPPED | OUTPATIENT
Start: 2017-09-08 | End: 2017-09-15

## 2017-09-08 NOTE — PROGRESS NOTES
1. Have you been to the ER, urgent care clinic since your last visit? Hospitalized since your last visit? NO    2. Have you seen or consulted any other health care providers outside of the [address] [place] [address] since your last visit? Include any pap smears or colon screening.  NO

## 2017-09-08 NOTE — TELEPHONE ENCOUNTER
Identified patient 2 identifiers verified. Patient has an appointment with Dr. Beny De today about abdominal issues.

## 2017-09-08 NOTE — PROGRESS NOTES
SUBJECTIVE  Ms. Elsa Griffith presents today acutely for     Chief Complaint   Patient presents with    Abdominal Pain     per today concerned of lower abdominal pain and cramping; pt goes back and forth with cramping and loose bowels        She has had a bit of constipated. For the past couple of weeks, some loose stools and cramping pain with BM. She has had a colonoscopy in the past showing some diverticulosis. OBJECTIVE  Visit Vitals    /73 (BP 1 Location: Left arm, BP Patient Position: Sitting)    Pulse 82    Temp 97.9 °F (36.6 °C) (Oral)    Resp 14    Ht 5' 10\" (1.778 m)    Wt 199 lb 9.6 oz (90.5 kg)    SpO2 98%    BMI 28.64 kg/m2     Gen: Pleasant 63 y.o.  female in NAD.   HEENT: PERRLA. EOMI. OP moist and pink.  Neck: Supple.  No LAD.  HEART: RRR, No M/G/R.   LUNGS: CTAB No W/R.   ABDOMEN: S, +TTP LLQ, ND, BS+.   EXTREMITIES: Warm. No C/C/E. SKIN: Warm. Dry. No rashes or other lesions noted. ASSESSMENT / PLAN    ICD-10-CM ICD-9-CM    1. LLQ abdominal pain--possibly diverticulits R10.32 789.04 CT ABD PELV W WO CONT      CBC WITH AUTOMATED DIFF      METABOLIC PANEL, COMPREHENSIVE      ciprofloxacin HCl (CIPRO) 250 mg tablet      metroNIDAZOLE (FLAGYL) 500 mg tablet       I have reviewed with the patient details of the assessment and plan and all questions were answered. Relevant patient education was performed. Follow-up Disposition:  Return if symptoms worsen or fail to improve.

## 2017-09-08 NOTE — MR AVS SNAPSHOT
Visit Information Date & Time Provider Department Dept. Phone Encounter #  
 9/8/2017  4:00 PM Jori Mcgarry, [address] [place] [address],[address] [phone] [id number] Follow-up Instructions Return if symptoms worsen or fail to improve. Upcoming Health Maintenance Date Due Pneumococcal 19-64 Highest Risk (1 of 3 - PCV13) 5/27/1976 DTaP/Tdap/Td series (1 - Tdap) 5/27/1978 ZOSTER VACCINE AGE 60> 3/27/2017 COLONOSCOPY 6/27/2017 INFLUENZA AGE 9 TO ADULT 8/1/2017 BREAST CANCER SCRN MAMMOGRAM 8/30/2019 PAP AKA CERVICAL CYTOLOGY 8/30/2019 Allergies as of 9/8/2017  Review Complete On: 9/8/2017 By: Kelly Dueñas Severity Noted Reaction Type Reactions Guiafen-pse [Pseudoephedrine-guaifenesin]  01/20/2011   Side Effect Other (comments) Current Immunizations  Reviewed on 11/2/2011 Name Date Influenza Vaccine Split 11/1/2012  5:05 PM, 11/2/2011 Not reviewed this visit You Were Diagnosed With   
  
 Codes Comments LLQ abdominal pain    -  Primary ICD-10-CM: R10.32 
ICD-9-CM: 789.04 Vitals BP Pulse Temp Resp Height(growth percentile) Weight(growth percentile) 107/73 (BP 1 Location: Left arm, BP Patient Position: Sitting) 82 97.9 °F (36.6 °C) (Oral) 14 5' 10\" (1.778 m) 199 lb 9.6 oz (90.5 kg) SpO2 BMI OB Status Smoking Status 98% 28.64 kg/m2 Hysterectomy Never Smoker Vitals History BMI and BSA Data Body Mass Index Body Surface Area  
 28.64 kg/m 2 2.11 m 2 Preferred Pharmacy Pharmacy Name Phone Micky Gallagher, [facility] [phone] Your Updated Medication List  
  
   
This list is accurate as of: 9/8/17  4:46 PM.  Always use your most recent med list.  
  
  
  
  
 ALPRAZolam 0.25 mg tablet Commonly known as:  Roverto Flack Take 1 Tab by mouth three (3) times daily as needed for Anxiety. CINNAMON 500 mg Cap Generic drug:  cinnamon bark Take 1,000 mg by mouth daily. ciprofloxacin HCl 250 mg tablet Commonly known as:  CIPRO Take 1 Tab by mouth two (2) times a day for 7 days. clindamycin 1 % topical gel Commonly known as:  CLINDAGEL  
APPLY TO AFFECTED AREA AS NEEDED FISH OIL 1,000 mg Cap Generic drug:  omega-3 fatty acids-vitamin e Take 1 Cap by mouth daily. hydroCHLOROthiazide 25 mg tablet Commonly known as:  HYDRODIURIL  
take 1 tablet by mouth once daily  
  
 losartan 50 mg tablet Commonly known as:  COZAAR  
take 1 tablet by mouth once daily  
  
 metroNIDAZOLE 500 mg tablet Commonly known as:  FLAGYL Take 1 Tab by mouth three (3) times daily for 7 days. MUCINEX 600 mg SR tablet Generic drug:  guaiFENesin SR Take 600 mg by mouth two (2) times a day. MULTI-VITAMIN PO Take  by mouth.  
  
 piroxicam 20 mg capsule Commonly known as:  Laurena Alejandra Take 1 Cap by mouth daily. VITAMIN D3 1,000 unit tablet Generic drug:  cholecalciferol Take 2,000 Units by mouth daily. Prescriptions Sent to Pharmacy Refills  
 ciprofloxacin HCl (CIPRO) 250 mg tablet 0 Sig: Take 1 Tab by mouth two (2) times a day for 7 days. Class: Normal  
 Pharmacy: [place] [address], [facility] Ph #: [phone] Route: Oral  
 metroNIDAZOLE (FLAGYL) 500 mg tablet 0 Sig: Take 1 Tab by mouth three (3) times daily for 7 days. Class: Normal  
 Pharmacy: [place] [address], [facility] Ph #: [phone] Route: Oral  
  
We Performed the Following CBC WITH AUTOMATED DIFF [94069 CPT(R)] METABOLIC PANEL, COMPREHENSIVE [12335 CPT(R)] Follow-up Instructions Return if symptoms worsen or fail to improve. To-Do List   
 09/08/2017 Imaging:  CT ABD PELV W WO CONT Patient Instructions Diverticulitis: Care Instructions Your Care Instructions Diverticulitis occurs when pouches form in the wall of the colon and become inflamed or infected. It can be very painful. Doctors aren't sure what causes diverticulitis. There is no proof that foods such as nuts, seeds, or berries cause it or make it worse. A low-fiber diet may cause the colon to work harder to push stool forward. Pouches may form because of this extra work. It may be hard to think about healthy eating while you're in pain. But as you recover, you might think about how you can use healthy eating for overall better health. Healthy eating may help you avoid future attacks. Follow-up care is a key part of your treatment and safety. Be sure to make and go to all appointments, and call your doctor if you are having problems. It's also a good idea to know your test results and keep a list of the medicines you take. How can you care for yourself at home? · Drink plenty of fluids, enough so that your urine is light yellow or clear like water. If you have kidney, heart, or liver disease and have to limit fluids, talk with your doctor before you increase the amount of fluids you drink. · Stick to liquids or a bland diet (plain rice, bananas, dry toast or crackers, applesauce) until you are feeling better. Then you can return to regular foods and gradually increase the amount of fiber in your diet. · Use a heating pad set on low on your belly to relieve mild cramps and pain. · Get extra rest until you are feeling better. · Be safe with medicines. Read and follow all instructions on the label. ¨ If the doctor gave you a prescription medicine for pain, take it as prescribed. ¨ If you are not taking a prescription pain medicine, ask your doctor if you can take an over-the-counter medicine. · If your doctor prescribed antibiotics, take them as directed. Do not stop taking them just because you feel better. You need to take the full course of antibiotics. To prevent future attacks of diverticulitis · Avoid constipation: 
¨ Include fruits, vegetables, beans, and whole grains in your diet each day. These foods are high in fiber. ¨ Drink plenty of fluids, enough so that your urine is light yellow or clear like water. If you have kidney, heart, or liver disease and have to limit fluids, talk with your doctor before you increase the amount of fluids you drink. ¨ Get some exercise every day. Build up slowly to 30 to 60 minutes a day on 5 or more days of the week. ¨ Take a fiber supplement, such as Citrucel or Metamucil, every day if needed. Read and follow all instructions on the label. ¨ Schedule time each day for a bowel movement. Having a daily routine may help. Take your time and do not strain when having a bowel movement. When should you call for help? Call 911 anytime you think you may need emergency care. For example, call if: 
· You passed out (lost consciousness). · You vomit blood or what looks like coffee grounds. · You pass maroon or very bloody stools. Call your doctor now or seek immediate medical care if: 
· You have severe pain or swelling in your belly. · You have a new or higher fever. · You cannot keep down fluids or medicines. · You have new pain that gets worse when you move or cough. Watch closely for changes in your health, and be sure to contact your doctor if: · The symptoms you had when you first started feeling sick come back. · You do not get better as expected. Where can you learn more? Go to http://afsaneh-jeana.info/. Enter H901 in the search box to learn more about \"Diverticulitis: Care Instructions. \" Current as of: August 9, 2016 Content Version: 11.3 © 3812-9027 [employer]. Care instructions adapted under license by [employer] (which disclaims liability or warranty for this information).  If you have questions about a medical condition or this instruction, always ask your healthcare professional. [address] any warranty or liability for your use of this information. Introducing [facility] & HEALTH SERVICES! Dear Guillermina Avitia: Thank you for requesting a [employer] account. Our records indicate that you have previously registered for a [employer] account but its currently inactive. Please call our [employer] support line at [phone]. Additional Information If you have questions, please visit the Frequently Asked Questions section of the [employer] website at [email or web address] Remember, [employer] is NOT to be used for urgent needs. For medical emergencies, dial 911. Now available from your iPhone and Android! Please provide this summary of care documentation to your next provider. Your primary care clinician is listed as [place]. If you have any questions after today's visit, please call [phone].

## 2017-09-08 NOTE — PATIENT INSTRUCTIONS
Diverticulitis: Care Instructions  Your Care Instructions    Diverticulitis occurs when pouches form in the wall of the colon and become inflamed or infected. It can be very painful. Doctors aren't sure what causes diverticulitis. There is no proof that foods such as nuts, seeds, or berries cause it or make it worse. A low-fiber diet may cause the colon to work harder to push stool forward. Pouches may form because of this extra work. It may be hard to think about healthy eating while you're in pain. But as you recover, you might think about how you can use healthy eating for overall better health. Healthy eating may help you avoid future attacks. Follow-up care is a key part of your treatment and safety. Be sure to make and go to all appointments, and call your doctor if you are having problems. It's also a good idea to know your test results and keep a list of the medicines you take. How can you care for yourself at home? · Drink plenty of fluids, enough so that your urine is light yellow or clear like water. If you have kidney, heart, or liver disease and have to limit fluids, talk with your doctor before you increase the amount of fluids you drink. · Stick to liquids or a bland diet (plain rice, bananas, dry toast or crackers, applesauce) until you are feeling better. Then you can return to regular foods and gradually increase the amount of fiber in your diet. · Use a heating pad set on low on your belly to relieve mild cramps and pain. · Get extra rest until you are feeling better. · Be safe with medicines. Read and follow all instructions on the label. ¨ If the doctor gave you a prescription medicine for pain, take it as prescribed. ¨ If you are not taking a prescription pain medicine, ask your doctor if you can take an over-the-counter medicine. · If your doctor prescribed antibiotics, take them as directed. Do not stop taking them just because you feel better.  You need to take the full course of antibiotics. To prevent future attacks of diverticulitis  · Avoid constipation:  ¨ Include fruits, vegetables, beans, and whole grains in your diet each day. These foods are high in fiber. ¨ Drink plenty of fluids, enough so that your urine is light yellow or clear like water. If you have kidney, heart, or liver disease and have to limit fluids, talk with your doctor before you increase the amount of fluids you drink. ¨ Get some exercise every day. Build up slowly to 30 to 60 minutes a day on 5 or more days of the week. ¨ Take a fiber supplement, such as Citrucel or Metamucil, every day if needed. Read and follow all instructions on the label. ¨ Schedule time each day for a bowel movement. Having a daily routine may help. Take your time and do not strain when having a bowel movement. When should you call for help? Call 911 anytime you think you may need emergency care. For example, call if:  · You passed out (lost consciousness). · You vomit blood or what looks like coffee grounds. · You pass maroon or very bloody stools. Call your doctor now or seek immediate medical care if:  · You have severe pain or swelling in your belly. · You have a new or higher fever. · You cannot keep down fluids or medicines. · You have new pain that gets worse when you move or cough. Watch closely for changes in your health, and be sure to contact your doctor if:  · The symptoms you had when you first started feeling sick come back. · You do not get better as expected. Where can you learn more? Go to http://afsaneh-jeana.info/. Enter H901 in the search box to learn more about \"Diverticulitis: Care Instructions. \"  Current as of: August 9, 2016  Content Version: 11.3  © 1694-3748 [employer]. Care instructions adapted under license by [employer] (which disclaims liability or warranty for this information).  If you have questions about a medical condition or this instruction, always ask your healthcare professional. [place] [address] any warranty or liability for your use of this information.

## 2017-11-10 ENCOUNTER — TELEPHONE (OUTPATIENT)
Dept: INTERNAL MEDICINE CLINIC | Age: 62
End: 2017-11-10

## 2017-11-10 RX ORDER — LOSARTAN POTASSIUM 25 MG/1
25 TABLET ORAL DAILY
Qty: 30 TAB | Refills: 11 | Status: SHIPPED | OUTPATIENT
Start: 2017-11-10

## 2017-11-10 NOTE — TELEPHONE ENCOUNTER
Patient would like to see if she can get a 25mg tab of Larsartan instead of 50mg called to [facility], [phone].  Contact is [phone]         Message received & copied from [facility]

## 2017-11-13 RX ORDER — HYDROCHLOROTHIAZIDE 25 MG/1
TABLET ORAL
Qty: 90 TAB | Refills: 3 | Status: SHIPPED | OUTPATIENT
Start: 2017-11-13 | End: 2018-08-13 | Stop reason: SDUPTHER

## 2017-11-13 NOTE — TELEPHONE ENCOUNTER
Message was left for patient to follow up to see if she received her medication that was called in to the pharmacy.

## 2018-01-29 ENCOUNTER — TELEPHONE (OUTPATIENT)
Dept: INTERNAL MEDICINE CLINIC | Age: 63
End: 2018-01-29

## 2018-01-29 RX ORDER — OSELTAMIVIR PHOSPHATE 75 MG/1
75 CAPSULE ORAL DAILY
Qty: 10 CAP | Refills: 0 | Status: SHIPPED | OUTPATIENT
Start: 2018-01-29 | End: 2018-02-08

## 2018-01-29 NOTE — TELEPHONE ENCOUNTER
Medication below has been phoned in to [facility] in [address] pt has been made aware. Requested Prescriptions     Signed Prescriptions Disp Refills    oseltamivir (TAMIFLU) 75 mg capsule 10 Cap 0     Sig: Take 1 Cap by mouth daily for 10 days.      Authorizing Provider: Joaquin Do

## 2018-01-29 NOTE — TELEPHONE ENCOUNTER
Patient states she needs a call back in reference to flu exposure by rona on Sunday, 1/28/18 & is requesting to discuss getting Tamiflu called in & exposure time. Please call.  Thank you

## 2018-01-29 NOTE — TELEPHONE ENCOUNTER
Identified patient 2 identifiers verified. Grandson diagnosed with Influenza A today. No temp, nobody achs, throat scaratchy.

## 2018-06-07 ENCOUNTER — TELEPHONE (OUTPATIENT)
Dept: INTERNAL MEDICINE CLINIC | Age: 63
End: 2018-06-07

## 2018-06-08 NOTE — TELEPHONE ENCOUNTER
Patient needs a call back in reference to discussing possibly getting an order for an ultrasound for breast bruising. Please call to discuss.  Thank you

## 2018-06-11 NOTE — TELEPHONE ENCOUNTER
----- Message from Guero Nolen sent at 6/11/2018 11:12 AM EDT -----  Regarding: /Telephone  : Lore Aguilar, Sister, pt is requesting a breast ultra sound be ordered. Best contact [phone].         Message copied/pasted from [facility]

## 2018-08-14 RX ORDER — HYDROCHLOROTHIAZIDE 25 MG/1
25 TABLET ORAL DAILY
Qty: 90 TAB | Refills: 0 | Status: SHIPPED | OUTPATIENT
Start: 2018-08-14 | End: 2018-12-03 | Stop reason: SDUPTHER

## 2018-12-03 ENCOUNTER — OFFICE VISIT (OUTPATIENT)
Dept: INTERNAL MEDICINE CLINIC | Age: 63
End: 2018-12-03

## 2018-12-03 VITALS
OXYGEN SATURATION: 99 % | DIASTOLIC BLOOD PRESSURE: 67 MMHG | SYSTOLIC BLOOD PRESSURE: 95 MMHG | RESPIRATION RATE: 18 BRPM | HEIGHT: 70 IN | WEIGHT: 198 LBS | BODY MASS INDEX: 28.35 KG/M2 | HEART RATE: 69 BPM | TEMPERATURE: 97.9 F

## 2018-12-03 DIAGNOSIS — N39.0 URINARY TRACT INFECTION WITH HEMATURIA, SITE UNSPECIFIED: ICD-10-CM

## 2018-12-03 DIAGNOSIS — F41.9 ANXIETY: ICD-10-CM

## 2018-12-03 DIAGNOSIS — R31.9 URINARY TRACT INFECTION WITH HEMATURIA, SITE UNSPECIFIED: ICD-10-CM

## 2018-12-03 DIAGNOSIS — Z00.00 ROUTINE GENERAL MEDICAL EXAMINATION AT A HEALTH CARE FACILITY: Primary | ICD-10-CM

## 2018-12-03 LAB
BILIRUB UR QL STRIP: NEGATIVE
GLUCOSE UR-MCNC: NEGATIVE MG/DL
KETONES P FAST UR STRIP-MCNC: NEGATIVE MG/DL
PH UR STRIP: 7 [PH] (ref 4.6–8)
PROT UR QL STRIP: NEGATIVE
SP GR UR STRIP: 1.01 (ref 1–1.03)
UA UROBILINOGEN AMB POC: NORMAL (ref 0.2–1)
URINALYSIS CLARITY POC: CLEAR
URINALYSIS COLOR POC: YELLOW
URINE BLOOD POC: NORMAL
URINE LEUKOCYTES POC: NORMAL
URINE NITRITES POC: NEGATIVE

## 2018-12-03 RX ORDER — LOSARTAN POTASSIUM 50 MG/1
25 TABLET ORAL DAILY
Qty: 90 TAB | Refills: 3 | Status: SHIPPED | OUTPATIENT
Start: 2018-12-03

## 2018-12-03 RX ORDER — HYDROCHLOROTHIAZIDE 25 MG/1
25 TABLET ORAL DAILY
Qty: 90 TAB | Refills: 3 | Status: SHIPPED | OUTPATIENT
Start: 2018-12-03

## 2018-12-03 RX ORDER — LOSARTAN POTASSIUM 50 MG/1
25 TABLET ORAL DAILY
COMMUNITY
End: 2018-12-03 | Stop reason: SDUPTHER

## 2018-12-03 RX ORDER — FLUCONAZOLE 150 MG/1
150 TABLET ORAL DAILY
Qty: 1 TAB | Refills: 0 | Status: SHIPPED | OUTPATIENT
Start: 2018-12-03 | End: 2018-12-04

## 2018-12-03 RX ORDER — NITROFURANTOIN 25; 75 MG/1; MG/1
100 CAPSULE ORAL 2 TIMES DAILY
Qty: 28 CAP | Refills: 1 | Status: SHIPPED | OUTPATIENT
Start: 2018-12-03

## 2018-12-03 RX ORDER — LORAZEPAM 0.5 MG/1
0.5 TABLET ORAL
Qty: 30 TAB | Refills: 2 | Status: SHIPPED | OUTPATIENT
Start: 2018-12-03

## 2018-12-03 NOTE — PROGRESS NOTES
SUBJECTIVE:   Ms. Anthony Smith is a 66 y.o. female who is here for follow up of routine medical issues. CPE. Formerly she saw Dr. Ashleigh Coleman. Chief Complaint   Patient presents with    Hypertension     pt here today for routine f.u     Medication Evaluation    Bladder Infection     pt had a UTI where test came back negative- pt c.o burning when urinating        She has had a bit of anxiety. Her mother has been in the hospital.   She has a UTI earlier in the year, and was given macrobid. Thin watery, tan discharge from vagina. She saw Dr. Zev Sierra for itching and a bruise under her breast.  Mammo and ultrasound are fine. She has \"chronic urethritis\"--she has seen Dr. Elmon Schilder in the past.  Treated for 3-4 months with doxy. Sees allergist, Dr. Danie Bravo. She has had allergy shots in the past.  Feels she has reactions to mold at work. At this time, she is otherwise doing well and has brought no other complaints to my attention today. For a list of the medical issues addressed today, see the assessment and plan below. PMH:  Dr. Sammie Perez is gynecologist.   Past Medical History:   Diagnosis Date    Broken leg 3/28/15    Cancer [facility])     endometrial cancer    Cystitis, acute 1/20/2011    Cystitis, acute 1/20/2011    Frequent UTI     GERD (gastroesophageal reflux disease)     Hypertension     Screening mammogram 04/2010    Vitamin D insufficiency 7/30/2014       Past Surgical History:   Procedure Laterality Date    HX COLONOSCOPY      HX GYN      cervical bx    HX ORTHOPAEDIC  7/3/11    fracture knee    HX ORTHOPAEDIC  2015    Valencia Olszewski  MARIZOL AND BSO  2011    Dr. Sammie Perez; endometrial cancer       All: She is allergic to guiafen-pse [pseudoephedrine-guaifenesin]. Current Outpatient Medications   Medication Sig    losartan (COZAAR) 50 mg tablet Take 0.5 Tabs by mouth daily.  hydroCHLOROthiazide (HYDRODIURIL) 25 mg tablet Take 1 Tab by mouth daily.     ALPRAZolam (XANAX) 0.25 mg tablet Take 1 Tab by mouth three (3) times daily as needed for Anxiety.  omega-3 fatty acids-vitamin e (FISH OIL) 1,000 mg Cap Take 1 Cap by mouth daily.  cinnamon bark (CINNAMON) 500 mg Cap Take 1,000 mg by mouth daily.  cholecalciferol, vitamin d3, (VITAMIN D) 1,000 unit tablet Take 2,000 Units by mouth daily.  MULTI-VITAMIN PO Take  by mouth.  losartan (COZAAR) 25 mg tablet Take 1 Tab by mouth daily.  piroxicam (FELDENE) 20 mg capsule Take 1 Cap by mouth daily.  clindamycin (CLINDAGEL) 1 % topical gel APPLY TO AFFECTED AREA AS NEEDED    guaifenesin SR (MUCINEX) 600 mg SR tablet Take 600 mg by mouth two (2) times a day. No current facility-administered medications for this visit. FH: Her family history includes Arthritis-rheumatoid in her father; Heart Disease in her father; Hypertension in her father, mother, and sister. F  82 \"heart valve problem. 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 She reports that  has never smoked. she has never used smokeless tobacco. She reports that she does not drink alcohol or use drugs. ROS: See above; Complete ROS otherwise negative. OBJECTIVE:   Vitals:   Visit Vitals  BP 95/67 (BP 1 Location: Left arm, BP Patient Position: Sitting)   Pulse 69   Temp 97.9 °F (36.6 °C) (Oral)   Resp 18   Ht 5' 10\" (1.778 m)   Wt 198 lb (89.8 kg)   SpO2 99%   BMI 28.41 kg/m²      Gen: Pleasant 66 y.o.  female in NAD. HEENT: PERRLA. EOMI. OP moist and pink. Neck: Supple. No LAD. HEART: RRR, No M/G/R.    LUNGS: CTAB No W/R. ABDOMEN: S, NT, ND, BS+. EXTREMITIES: Warm. No C/C/E.  MUSCULOSKELETAL: Normal ROM, muscle strength 5/5 all groups. NEURO: Alert and oriented x 3. Cranial nerves grossly intact. No focal sensory or motor deficits noted. SKIN: Warm. Dry. No rashes or other lesions noted.     Lab Results   Component Value Date/Time    Sodium 141 08/10/2017 10:03 AM    Potassium 4.3 08/10/2017 10:03 AM    Chloride 99 08/10/2017 10:03 AM    CO2 23 08/10/2017 10:03 AM    Anion gap 10 08/25/2011 06:05 AM    Glucose 92 08/10/2017 10:03 AM    BUN 19 08/10/2017 10:03 AM    Creatinine 0.85 08/10/2017 10:03 AM    BUN/Creatinine ratio 22 08/10/2017 10:03 AM    GFR est AA 86 08/10/2017 10:03 AM    GFR est non-AA 75 08/10/2017 10:03 AM    Calcium 9.5 08/10/2017 10:03 AM    Bilirubin, total 0.4 08/10/2017 10:03 AM    ALT (SGPT) 19 08/10/2017 10:03 AM    AST (SGOT) 22 08/10/2017 10:03 AM    Alk. phosphatase 83 08/10/2017 10:03 AM    Protein, total 7.0 08/10/2017 10:03 AM    Albumin 4.5 08/10/2017 10:03 AM    Globulin 3.2 08/19/2011 02:30 PM    A-G Ratio 1.8 08/10/2017 10:03 AM       Lab Results   Component Value Date/Time    Cholesterol, total 214 (H) 08/10/2017 10:03 AM    HDL Cholesterol 39 (L) 08/10/2017 10:03 AM    LDL, calculated 135 (H) 08/10/2017 10:03 AM    Triglyceride 199 (H) 08/10/2017 10:03 AM       Lab Results   Component Value Date/Time    WBC 6.7 08/10/2017 10:03 AM    HGB 13.1 08/10/2017 10:03 AM    HCT 38.6 08/10/2017 10:03 AM    PLATELET 044 16/18/6429 10:03 AM    MCV 93 08/10/2017 10:03 AM           ASSESSMENT/ PLAN: Gaetano Taveras was seen today for follow-up. 1. CPE: Normal exam.   2. UTI: trace kathy and blood. We'll get culture. Rx macrobid. She requests a two week course and refill; \"it's also good for my urethritis. \"   3. HTN (hypertension): BP fine today. Continue current medications.   - METABOLIC PANEL, COMPREHENSIVE  - CBC WITH AUTOMATED DIFF  4. Hyperlipidemia: Improved since last time. No changes.   - METABOLIC PANEL, COMPREHENSIVE  - LIPID PANEL  5. Urethritis, nonspecific: Continue follow up with Dr. Marylene Blades.   - Hiram Espinoza, COMPREHENSIVE  6. Back pain: UA ordered and shows result above--could be originating from bladder. Could be radicular pain as she has a history of sciatica. 7. Vitamin D insufficiency:   - VITAMIN D, 25 HYDROXY  8. Hx of colonic polyp  9.  Chronic rhinitis: 10. Reactive airways: Sees allergist, Dr. Bjorn Ledezma. 11. History of endometrial cancer: s/p hysterctomy. Follows with gynecology, Dr. Elsa Ortiz. 12. Acne: On cleocin cream.   13. Anxiety: Takes     Follow-up Disposition:  Return in about 6 months (around 6/3/2019) for HTN. I have reviewed the patient's medications and risks/side effects/benefits were discussed. Diagnosis(-es) explained to patient and questions answered. Literature provided where appropriate.

## 2018-12-03 NOTE — PROGRESS NOTES
1. Have you been to the ER, urgent care clinic since your last visit? Hospitalized since your last visit?no    2. Have you seen or consulted any other health care providers outside of the [address] [place] [address] since your last visit? Include any pap smears or colon screening.  no

## 2018-12-04 LAB — BACTERIA UR CULT: NO GROWTH

## 2019-01-09 ENCOUNTER — TELEPHONE (OUTPATIENT)
Dept: INTERNAL MEDICINE CLINIC | Age: 64
End: 2019-01-09

## 2019-01-09 DIAGNOSIS — R30.0 DYSURIA: Primary | ICD-10-CM

## 2019-01-09 RX ORDER — CIPROFLOXACIN 500 MG/1
500 TABLET ORAL 2 TIMES DAILY
Qty: 10 TAB | Refills: 0 | Status: SHIPPED | OUTPATIENT
Start: 2019-01-09 | End: 2019-01-14

## 2019-01-09 NOTE — TELEPHONE ENCOUNTER
Identified patient 2 identifiers verified. Patient complaining of burning , no frequency or odor  Requesting  That  Another prescription be called in.

## 2019-01-09 NOTE — TELEPHONE ENCOUNTER
----- Message from Moiz Mo sent at 1/9/2019 10:39 AM EST -----  Regarding: Foster/telephone  Pt stated she feels like her bladder infection never went away and she is requesting a different medication. Pts number is [phone] and [facility].       Copy/paste envera

## 2020-09-03 ENCOUNTER — TELEPHONE (OUTPATIENT)
Dept: INTERNAL MEDICINE CLINIC | Age: 65
End: 2020-09-03

## 2020-09-03 NOTE — TELEPHONE ENCOUNTER
Reason for call: to offer virtual visit    I called pt, no answer- left voice message to return call to office.